# Patient Record
Sex: FEMALE | Race: WHITE
[De-identification: names, ages, dates, MRNs, and addresses within clinical notes are randomized per-mention and may not be internally consistent; named-entity substitution may affect disease eponyms.]

---

## 2019-03-11 ENCOUNTER — HOSPITAL ENCOUNTER (EMERGENCY)
Dept: HOSPITAL 56 - MW.ED | Age: 49
Discharge: HOME | End: 2019-03-11
Payer: COMMERCIAL

## 2019-03-11 DIAGNOSIS — Z88.5: ICD-10-CM
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
## 2019-03-11 DIAGNOSIS — Z88.8: ICD-10-CM

## 2019-03-11 DIAGNOSIS — S82.832A: Primary | ICD-10-CM

## 2019-03-11 NOTE — EDM.PDOC
ED HPI GENERAL MEDICAL PROBLEM





- General


Chief Complaint: Lower Extremity Injury/Pain


Stated Complaint: LEFT ANKLE PAIN


Time Seen by Provider: 19 13:35


Source of Information: Reports: Patient


History Limitations: Reports: No Limitations





- History of Present Illness


INITIAL COMMENTS - FREE TEXT/NARRATIVE: 


History of present illness:


[]She rolled her left ankle on the ice 3 days ago and complains of pain on the 

outside of her ankle and in the back of her leg. She states she heard a  pop 

and has been using crutches since the injury. She denies any other injuries.





Review of systems: 


As per history of present illness and below otherwise all systems reviewed and 

negative.





Past medical history: 


As per history of present illness and as reviewed below otherwise 

noncontributory.





Surgical history: 


As per history of present illness and as reviewed below otherwise 

noncontributory.





Social history: 


No reported history of drug or alcohol abuse.





Family history: 


As per history of present illness and as reviewed below otherwise 

noncontributory.





Physical exam:


General: Well developed, well nourished in NAD


HEENT: Atraumatic, normocephalic, pupils reactive, negative for conjunctival 

pallor or scleral icterus, mucous membranes moist, throat clear, neck supple, 

nontender, trachea midline.


Lungs: Clear to auscultation, breath sounds equal bilaterally, chest nontender.


Heart: S1S2, regular, negative for clicks, rubs, or JVD.


Abdomen: NABS, Soft, nondistended, nontender. Negative for masses or 

hepatosplenomegaly. Negative for costovertebral tenderness.


Pelvis: Stable nontender.


Genitourinary: Deferred.


Rectal: Deferred.


Extremities: Mild swelling over the lateral malleolus tenderness over the 

distal lateral and posterior leg, negative for cords or calf pain. 

Neurovascular unremarkable.


Neuro: Awake, alert, oriented. Cranial nerves II through XII unremarkable. 

Cerebellum unremarkable. Motor and sensory unremarkable throughout. Exam 

nonfocal.


Skin:warm and dry





Diagnostics:


X-ray shows a fibula fracture nondisplaced





Therapeutics:


Declined pain meds, posterior splint





ED Course:


Stable





Impression: 


Nondisplaced distal fibular fracture





Prescriptions:


None





Plan:


Ibuprofen, ice, elevation, follow up with orthopedics return to ER if symptoms 

worsen or change.





Definitive disposition and diagnosis as appropriate pending reevaluation and 

review of above.





  ** left ankle


Pain Score (Numeric/FACES): 5





- Related Data


 Allergies











Allergy/AdvReac Type Severity Reaction Status Date / Time


 


acetaminophen [From Vicodin] Allergy  Nausea and Verified 19 13:41





   Vomiting  


 


hydrocodone [From Vicodin] Allergy  Nausea and Verified 19 13:41





   Vomiting  


 


morphine Allergy  Nausea and Verified 19 13:41





   Vomiting  











Home Meds: 


 Home Meds





. [No Known Home Meds]  19 [History]











Past Medical History


HEENT History: Reports: None


Cardiovascular History: Reports: None


Respiratory History: Reports: None


Gastrointestinal History: Reports: None


Genitourinary History: Reports: None


OB/GYN History: Reports: None


Musculoskeletal History: Reports: None


Neurological History: Reports: None


Psychiatric History: Reports: None


Endocrine/Metabolic History: Reports: None


Hematologic History: Reports: None


Immunologic History: Reports: None


Oncologic (Cancer) History: Reports: None


Dermatologic History: Reports: None





- Infectious Disease History


Infectious Disease History: Reports: Chicken Pox





- Past Surgical History


Head Surgeries/Procedures: Reports: None


HEENT Surgical History: Reports: None


Cardiovascular Surgical History: Reports: None


Respiratory Surgical History: Reports: None


GI Surgical History: Reports: None


Female  Surgical History: Reports:  Section


Endocrine Surgical History: Reports: None


Neurological Surgical History: Reports: None


Musculoskeletal Surgical History: Reports: Other (See Below)


Other Musculoskeletal Surgeries/Procedures:: knee reconstructions


Oncologic Surgical History: Reports: None


Dermatological Surgical History: Reports: None





Social & Family History





- Family History


Family Medical History: Noncontributory





- Tobacco Use


Smoking Status *Q: Never Smoker


Second Hand Smoke Exposure: No





- Caffeine Use


Caffeine Use: Reports: Coffee





- Recreational Drug Use


Recreational Drug Use: No





Review of Systems





- Review of Systems


Review Of Systems: ROS reveals no pertinent complaints other than HPI.





ED EXAM, GENERAL





- Physical Exam


Exam: See Below (ED history of present illness)





Course





- Vital Signs


Last Recorded V/S: 





 Last Vital Signs











Temp  96.2 F   19 13:42


 


Pulse  80   19 13:42


 


Resp  18   19 13:42


 


BP  129/60   19 13:42


 


Pulse Ox  98   19 13:42














- Orders/Labs/Meds


Orders: 





 Active Orders 24 hr











 Category Date Time Status


 


 Splinting [RC] ASDIRECTED Care  19 14:05 Ordered


 


 Ankle Min 3V Lt [CR] Stat Exams  19 13:49 Ordered














Departure





- Departure


Time of Disposition: 14:08


Disposition: Home, Self-Care 01


Condition: Good


Clinical Impression: 


Fibula fracture


Qualifiers:


 Encounter type: initial encounter Fibula location: distal Fracture type: 

closed Fracture morphology: other fracture Laterality: left Qualified Code(s): 

S82.832A - Other fracture of upper and lower end of left fibula, initial 

encounter for closed fracture








- Discharge Information


*PRESCRIPTION DRUG MONITORING PROGRAM REVIEWED*: Not Applicable


*COPY OF PRESCRIPTION DRUG MONITORING REPORT IN PATIENT ROWENA: Not Applicable


Additional Instructions: 


The following information is given to patients seen in the emergency department 

who are being discharged to home. This information is to outline your options 

for follow-up care. We provide all patients seen in our emergency department 

with a follow-up referral.





The need for follow-up, as well as the timing and circumstances, are variable 

depending upon the specifics of your emergency department visit.





If you don't have a primary care physician on staff, we will provide you with a 

referral. We always advise you to contact your personal physician following an 

emergency department visit to inform them of the circumstance of the visit and 

for follow-up with them and/or the need for any referrals to a consulting 

specialist.





The emergency department will also refer you to a specialist when appropriate. 

This referral assures that you have the opportunity for follow-up care with a 

specialist. All of these measure are taken in an effort to provide you with 

optimal care, which includes your follow-up.





Under all circumstances we always encourage you to contact your private 

physician who remains a resource for coordinating your care. When calling for 

follow-up care, please make the office aware that this follow-up is from your 

recent emergency room visit. If for any reason you are refused follow-up, 

please contact the [facility] Emergency 

Department at [phone] and asked to speak to the emergency department 

charge nurse.





ice, Elevation, ibuprofen for pain, follow-up with orthopedic return if 

symptoms worsen or change.





[facility]


Specialty Care - Orthopedic Clinic


 [facility] [address], [address]


[place] [address]


Phone: [phone]


Fax: [phone]





- My Orders


Last 24 Hours: 





My Active Orders





19 13:49


Ankle Min 3V Lt [CR] Stat 





19 14:05


Splinting [RC] ASDIRECTED 














- Assessment/Plan


Last 24 Hours: 





My Active Orders





19 13:49


Ankle Min 3V Lt [CR] Stat 





19 14:05


Splinting [RC] ASDIRECTED

## 2019-03-11 NOTE — CR
EXAMINATION: Left ankle

 

HISTORY: Pain

 

COMPARISON: None

 

TECHNIQUE: 3 views

 

FINDINGS/IMPRESSION: There is a nondisplaced distal fibular fracture noted with

overlying soft tissue swelling. The remaining osseous structures and joint

spaces appear preserved. Early osteophyte formation within the midfoot and a

tiny plantar calcaneal spur.

## 2019-03-20 ENCOUNTER — HOSPITAL ENCOUNTER (OUTPATIENT)
Dept: HOSPITAL 56 - MW.SDS | Age: 49
Discharge: HOME | End: 2019-03-20
Attending: ORTHOPAEDIC SURGERY
Payer: COMMERCIAL

## 2019-03-20 DIAGNOSIS — S82.842A: Primary | ICD-10-CM

## 2019-03-20 DIAGNOSIS — E66.9: ICD-10-CM

## 2019-03-20 DIAGNOSIS — Z88.5: ICD-10-CM
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
## 2019-03-20 PROCEDURE — 81025 URINE PREGNANCY TEST: CPT

## 2019-03-20 PROCEDURE — C1713 ANCHOR/SCREW BN/BN,TIS/BN: HCPCS

## 2019-03-20 PROCEDURE — 27814 TREATMENT OF ANKLE FRACTURE: CPT

## 2019-03-20 NOTE — PCM.PREANE
Preanesthetic Assessment





- Anesthesia/Transfusion/Family Hx


Anesthesia History: Prior Anesthesia Without Reaction


Family History of Anesthesia Reaction: No


Transfusion History: Prior Transfusion Without Reaction


Intubation History: Unknown





- Review of Systems


General: No Symptoms


Pulmonary: No Symptoms


Cardiovascular: No Symptoms


Gastrointestinal: No Symptoms


Neurological: No Symptoms


Other: Reports: None





- Physical Assessment


Height: 1.68 m


Weight: 90.718 kg


ASA Class: 2


Mental Status: Alert & Oriented x3


Airway Class: Mallampati = 2


Dentition: Reports: Normal Dentition


Thyro-Mental Finger Breadths: 3


Mouth Opening Finger Breadths: 3


ROM/Head Extension: Full


Lungs: Clear to Auscultation, Normal Respiratory Effort


Cardiovascular: Regular Rate, Regular Rhythm





- Lab


Values: 





 Laboratory Last Values











Urine HCG, Qual  NEGATIVE  (NEGATIVE)   19  11:11    














- Allergies


Allergies/Adverse Reactions: 


 Allergies











Allergy/AdvReac Type Severity Reaction Status Date / Time


 


acetaminophen [From Vicodin] Allergy  Nausea and Verified 19 09:29





   Vomiting  


 


hydrocodone [From Vicodin] Allergy  Nausea and Verified 19 09:29





   Vomiting  














- Blood


Blood Available: No





- Anesthesia Plan


Pre-Op Medication Ordered: None





- Acknowledgements


Anesthesia Type Planned: General Anesthesia


Pt an Appropriate Candidate for the Planned Anesthesia: Yes


Alternatives and Risks of Anesthesia Discussed w Pt/Guardian: Yes


Pt/Guardian Understands and Agrees with Anesthesia Plan: Yes





PreAnesthesia Questionnaire


HEENT History: Reports: Other (See Below)


Other HEENT History: uses reading glasses


Cardiovascular History: Reports: None


Respiratory History: Reports: None


Gastrointestinal History: Reports: None


Genitourinary History: Reports: None


OB/GYN History: Reports: Pregnancy


Musculoskeletal History: Reports: Neck Pain, Chronic, Other (See Below) (left 

ankle fx. at present time)


Neurological History: Reports: Concussion, Other (See Below)


Other Neuro History: hx of motion sickness


Psychiatric History: Reports: None


Endocrine/Metabolic History: Reports: Obesity/BMI 30+


Hematologic History: Reports: Blood Transfusion(s)


Immunologic History: Reports: None


Oncologic (Cancer) History: Reports: None


Dermatologic History: Reports: None





- Infectious Disease History


Infectious Disease History: Reports: Chicken Pox





- Past Surgical History


Head Surgeries/Procedures: Reports: None


Female  Surgical History: Reports:  Section (x2)


Musculoskeletal Surgical History: Reports: Other (See Below)


Other Musculoskeletal Surgeries/Procedures:: Ligament reconstruction in both 

knees (possible screws)





- SUBSTANCE USE


Smoking Status *Q: Never Smoker


Recreational Drug Use History: No





- HOME MEDS


Home Medications: 


 Home Meds





. [No Known Home Meds]  19 [History]











- CURRENT (IN HOUSE) MEDS


Current Meds: 





 Current Medications





Cefazolin Sodium/Dextrose 2 gm (/ Premix)  50 mls @ 100 mls/hr IV ONCALL [facility]


Lactated Ringer's (Ringers, Lactated)  1,000 mls @ 100 mls/hr IV ASDIRECTED [facility]


Oxycodone/Acetaminophen (Percocet 325-5 Mg)  1 - 2 tab PO Q4H PRN


   PRN Reason: Pain





Discontinued Medications





Bupivacaine HCl (Sensorcaine-Mpf 0.25%) Confirm Administered Dose 10 ml .ROUTE 

.STK-MED ONE


   Stop: 19 11:41

## 2019-03-20 NOTE — PCM.OPNOTE
- General Post-Op/Procedure Note


Date of Surgery/Procedure: 03/20/19


Operative Procedure(s): ORIF left distal fibula


Post-Op Diagnosis: L bimalleolar ankle fracture


Anesthesia Technique: General LMA


Primary Surgeon: Wendie Gomez


EBL in mLs: 5


Condition: Good


Free Text/Narrative:: 





tt=26 min


[id number]

## 2019-03-20 NOTE — PCM.POSTAN
POST ANESTHESIA ASSESSMENT





- MENTAL STATUS


Mental Status: Alert, Oriented





- RESPIRATORY


Respiratory Status: Respiratory Rate WNL, Airway Patent, O2 Saturation Stable





- CARDIOVASCULAR


CV Status: Pulse Rate WNL, Blood Pressure Stable





- GASTROINTESTINAL


GI Status: No Symptoms





- PAIN


Pain Score: 6





- POST OP HYDRATION


Hydration Status: Adequate & Stable





- OBSERVATIONS


Free Text/Narrative:: 





no anesthesia problems

## 2019-03-20 NOTE — OR
SURGEON:

Wendie Gomez MD

 

DATE OF PROCEDURE:  03/20/2019

 

PREOPERATIVE DIAGNOSIS:

Left bimalleolar ankle fracture.

 

POSTOPERATIVE DIAGNOSIS:

Left bimalleolar ankle fracture.

 

PROCEDURE:

Open reduction and internal fixation, left distal fibula.

 

ASSISTANT:

Briana Coronado RN.

 

ANESTHESIA:

General.

 

ESTIMATED BLOOD LOSS:

5 mL.

 

TOURNIQUET TIME:

26 minutes.

 

COMPLICATIONS:

None.

 

DVT PROPHYLAXIS:

PAS boot to the nonoperative leg.

 

IMPLANTS USED:

Jesu 7-hole 1/3rd semitubular plate with combination of 3.5 mm cortical

screws and 4.0 mm cancellous screws.

 

BRIEF HISTORY:

Anuja is a 48-year-old female who sustained a fall on the ice.  She complained

of right ankle pain.  X-rays were obtained, which showed a fracture of the

distal fibula with an avulsion fracture of the medial malleolus.  Due to the

unstable nature of her injury, I did recommend surgical intervention.  The risks

and goals of the procedure were discussed with the patient and were documented

preoperatively.  She agreed to proceed.

 

DESCRIPTION OF PROCEDURE:

The patient was properly identified and brought to the operating room.  She was

transferred from the OR cart and placed on the operating table in supine

position.  General anesthesia was administered.  After adequate anesthesia was

obtained, a well-padded tourniquet was applied to the left lower extremity.

Left lower extremity was then prepped in standard fashion using ChloraPrep

solution.  It was then sterilely draped.  A time-out was performed to ensure

correct site and procedure.  Preoperative antibiotics were given.  Surgical site

had been marked preoperatively.  An Esmarch was used to exsanguinate the left

lower extremity and the tourniquet was inflated to 250 mmHg.

 

An incision was made over the lateral aspect of the ankle.  Subcutaneous tissues

were dissected.  The superficial peroneal nerve was found to be anterior to the

incision, this was protected throughout the procedure.  The fracture site was

then identified.  It was opened and copiously irrigated with saline solution to

remove the fracture hematoma.  A bone reduction clamp was then used to reduce

the fracture.  A 3.5 mm cortical screw was then placed in an interfragmentary

manner using standard lag technique.  This provided good provisional fixation of

the fracture.  The fracture was quite oblique in nature.  I elected to use a 7-

hole plate as this did give us three holes proximal to the fracture.  A 2.5 mm

nonlocking screws were used proximally and two 4.0 mm fully-threaded cancellous

screws were used distally.  Final C-arm images confirmed acceptable reduction of

the fracture with good preservation of the ankle mortise.  The syndesmosis was

checked using live fluoroscopy with an external rotation stress view as well as

a lateral pull with bone hook, and no instability or widening of the syndesmosis

was noted.  The wound was then copiously irrigated with saline solution.  The

deep tissues were closed with 0 Vicryl.  Subcutaneous tissues were closed with 3-

0 Monocryl, and the skin was closed with staples.  Zero arm tourniquet was

deflated prior to final wound closure and no excess bleeding was noted.  1%

Lidocaine was injected around the incision at the completion of the procedure.

Xeroform gauze was placed over the wound and a bulky dressing was applied.  She

was then placed into a well-padded posterior splint with medial and lateral

stabilizing slabs.  She was awakened from her anesthetic and transferred back to

the operating room cart.  She was brought to recovery room in stable condition.

All needle and sponge counts were correct.

 

 

CRYSTAL / SHELIA

DD:  03/20/2019 14:09:58

DT:  03/20/2019 14:41:34

Job #:  [id number]

## 2019-03-22 NOTE — CR
EXAMINATION: Left ankle

 

HISTORY: ORIF

 

COMPARISON: 3/15/2019

 

TECHNIQUE: 5 fluoroscopic images provided

 

FINDINGS/IMPRESSION: Operative control films demonstrate screw and plate

fixation of a distal fibular fracture.